# Patient Record
Sex: MALE | NOT HISPANIC OR LATINO | Employment: FULL TIME | URBAN - METROPOLITAN AREA
[De-identification: names, ages, dates, MRNs, and addresses within clinical notes are randomized per-mention and may not be internally consistent; named-entity substitution may affect disease eponyms.]

---

## 2022-09-29 ENCOUNTER — HOSPITAL ENCOUNTER (EMERGENCY)
Facility: HOSPITAL | Age: 22
Discharge: PSYCHIATRIC OR SUBSTANCE USE FACILITY | End: 2022-09-30
Attending: EMERGENCY MEDICINE
Payer: OTHER GOVERNMENT

## 2022-09-29 DIAGNOSIS — R45.851 SUICIDAL IDEATION: Primary | ICD-10-CM

## 2022-09-29 DIAGNOSIS — Z00.8 MEDICAL CLEARANCE FOR PSYCHIATRIC ADMISSION: ICD-10-CM

## 2022-09-29 LAB
ALBUMIN SERPL-MCNC: 4.5 GM/DL (ref 3.5–5)
ALBUMIN/GLOB SERPL: 1.6 RATIO (ref 1.1–2)
ALP SERPL-CCNC: 106 UNIT/L (ref 40–150)
ALT SERPL-CCNC: 28 UNIT/L (ref 0–55)
AMPHET UR QL SCN: NEGATIVE
APPEARANCE UR: ABNORMAL
AST SERPL-CCNC: 18 UNIT/L (ref 5–34)
BACTERIA #/AREA URNS AUTO: NORMAL /HPF
BARBITURATE SCN PRESENT UR: NEGATIVE
BASOPHILS # BLD AUTO: 0.06 X10(3)/MCL (ref 0–0.2)
BASOPHILS NFR BLD AUTO: 1.1 %
BENZODIAZ UR QL SCN: NEGATIVE
BILIRUB UR QL STRIP.AUTO: NEGATIVE MG/DL
BILIRUBIN DIRECT+TOT PNL SERPL-MCNC: 0.8 MG/DL
BUN SERPL-MCNC: 14.5 MG/DL (ref 8.9–20.6)
CALCIUM SERPL-MCNC: 9.8 MG/DL (ref 8.4–10.2)
CANNABINOIDS UR QL SCN: NEGATIVE
CHLORIDE SERPL-SCNC: 108 MMOL/L (ref 98–107)
CO2 SERPL-SCNC: 25 MMOL/L (ref 22–29)
COCAINE UR QL SCN: NEGATIVE
COLOR UR AUTO: YELLOW
CREAT SERPL-MCNC: 0.8 MG/DL (ref 0.73–1.18)
EOSINOPHIL # BLD AUTO: 0.14 X10(3)/MCL (ref 0–0.9)
EOSINOPHIL NFR BLD AUTO: 2.6 %
ERYTHROCYTE [DISTWIDTH] IN BLOOD BY AUTOMATED COUNT: 12.5 % (ref 11.5–17)
ETHANOL SERPL-MCNC: <10 MG/DL
FENTANYL UR QL SCN: NEGATIVE
GFR SERPLBLD CREATININE-BSD FMLA CKD-EPI: >60 MLS/MIN/1.73/M2
GLOBULIN SER-MCNC: 2.8 GM/DL (ref 2.4–3.5)
GLUCOSE SERPL-MCNC: 93 MG/DL (ref 74–100)
GLUCOSE UR QL STRIP.AUTO: NEGATIVE MG/DL
HCT VFR BLD AUTO: 44.5 % (ref 42–52)
HGB BLD-MCNC: 15.3 GM/DL (ref 14–18)
IMM GRANULOCYTES # BLD AUTO: 0.01 X10(3)/MCL (ref 0–0.04)
IMM GRANULOCYTES NFR BLD AUTO: 0.2 %
KETONES UR QL STRIP.AUTO: NEGATIVE MG/DL
LEUKOCYTE ESTERASE UR QL STRIP.AUTO: NEGATIVE UNIT/L
LYMPHOCYTES # BLD AUTO: 1.58 X10(3)/MCL (ref 0.6–4.6)
LYMPHOCYTES NFR BLD AUTO: 29.6 %
MCH RBC QN AUTO: 30.6 PG (ref 27–31)
MCHC RBC AUTO-ENTMCNC: 34.4 MG/DL (ref 33–36)
MCV RBC AUTO: 89 FL (ref 80–94)
MDMA UR QL SCN: NEGATIVE
MONOCYTES # BLD AUTO: 0.52 X10(3)/MCL (ref 0.1–1.3)
MONOCYTES NFR BLD AUTO: 9.8 %
NEUTROPHILS # BLD AUTO: 3 X10(3)/MCL (ref 2.1–9.2)
NEUTROPHILS NFR BLD AUTO: 56.7 %
NITRITE UR QL STRIP.AUTO: NEGATIVE
NRBC BLD AUTO-RTO: 0 %
OPIATES UR QL SCN: NEGATIVE
PCP UR QL: NEGATIVE
PH UR STRIP.AUTO: 7.5 [PH]
PH UR: 7.5 [PH] (ref 3–11)
PLATELET # BLD AUTO: 310 X10(3)/MCL (ref 130–400)
PMV BLD AUTO: 9.4 FL (ref 7.4–10.4)
POTASSIUM SERPL-SCNC: 4.3 MMOL/L (ref 3.5–5.1)
PROT SERPL-MCNC: 7.3 GM/DL (ref 6.4–8.3)
PROT UR QL STRIP.AUTO: NEGATIVE MG/DL
RBC # BLD AUTO: 5 X10(6)/MCL (ref 4.7–6.1)
RBC #/AREA URNS AUTO: <5 /HPF
RBC UR QL AUTO: NEGATIVE UNIT/L
SARS-COV-2 RDRP RESP QL NAA+PROBE: NEGATIVE
SODIUM SERPL-SCNC: 138 MMOL/L (ref 136–145)
SP GR UR STRIP.AUTO: 1.02 (ref 1–1.03)
SPECIFIC GRAVITY, URINE AUTO (.000) (OHS): 1.02 (ref 1–1.03)
SQUAMOUS #/AREA URNS AUTO: <5 /HPF
TSH SERPL-ACNC: 1.05 UIU/ML (ref 0.35–4.94)
UROBILINOGEN UR STRIP-ACNC: 0.2 MG/DL
WBC # SPEC AUTO: 5.3 X10(3)/MCL (ref 4.5–11.5)
WBC #/AREA URNS AUTO: <5 /HPF

## 2022-09-29 PROCEDURE — 82077 ASSAY SPEC XCP UR&BREATH IA: CPT | Performed by: EMERGENCY MEDICINE

## 2022-09-29 PROCEDURE — 99285 EMERGENCY DEPT VISIT HI MDM: CPT | Mod: 25

## 2022-09-29 PROCEDURE — 80053 COMPREHEN METABOLIC PANEL: CPT | Performed by: EMERGENCY MEDICINE

## 2022-09-29 PROCEDURE — 36415 COLL VENOUS BLD VENIPUNCTURE: CPT | Performed by: EMERGENCY MEDICINE

## 2022-09-29 PROCEDURE — 80307 DRUG TEST PRSMV CHEM ANLYZR: CPT | Performed by: EMERGENCY MEDICINE

## 2022-09-29 PROCEDURE — 85025 COMPLETE CBC W/AUTO DIFF WBC: CPT | Performed by: EMERGENCY MEDICINE

## 2022-09-29 PROCEDURE — 87635 SARS-COV-2 COVID-19 AMP PRB: CPT | Performed by: EMERGENCY MEDICINE

## 2022-09-29 PROCEDURE — 81001 URINALYSIS AUTO W/SCOPE: CPT | Performed by: EMERGENCY MEDICINE

## 2022-09-29 PROCEDURE — 84443 ASSAY THYROID STIM HORMONE: CPT | Performed by: EMERGENCY MEDICINE
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
## 2022-09-30 VITALS
OXYGEN SATURATION: 98 % | HEART RATE: 91 BPM | WEIGHT: 175 LBS | RESPIRATION RATE: 16 BRPM | SYSTOLIC BLOOD PRESSURE: 131 MMHG | DIASTOLIC BLOOD PRESSURE: 71 MMHG | BODY MASS INDEX: 25.05 KG/M2 | TEMPERATURE: 98 F | HEIGHT: 70 IN

## 2023-08-20 ENCOUNTER — HOSPITAL ENCOUNTER (EMERGENCY)
Facility: HOSPITAL | Age: 23
Discharge: HOME | End: 2023-08-20
Attending: EMERGENCY MEDICINE
Payer: OTHER GOVERNMENT

## 2023-08-20 VITALS
SYSTOLIC BLOOD PRESSURE: 133 MMHG | RESPIRATION RATE: 18 BRPM | HEART RATE: 72 BPM | TEMPERATURE: 99 F | DIASTOLIC BLOOD PRESSURE: 77 MMHG | OXYGEN SATURATION: 98 %

## 2023-08-20 DIAGNOSIS — M25.562 LEFT KNEE PAIN: ICD-10-CM

## 2023-08-20 DIAGNOSIS — T14.8XXA ABRASION: ICD-10-CM

## 2023-08-20 DIAGNOSIS — M23.92 ACUTE INTERNAL DERANGEMENT OF KNEE, LEFT: Primary | ICD-10-CM

## 2023-08-20 PROCEDURE — 29505 APPLICATION LONG LEG SPLINT: CPT | Mod: LT

## 2023-08-20 PROCEDURE — 25000003 PHARM REV CODE 250: Performed by: NURSE PRACTITIONER

## 2023-08-20 PROCEDURE — 99283 EMERGENCY DEPT VISIT LOW MDM: CPT | Mod: 25

## 2023-08-20 RX ADMIN — BACITRACIN ZINC, NEOMYCIN, POLYMYXIN B: 400; 3.5; 5 OINTMENT TOPICAL at 12:08

## 2023-08-20 NOTE — Clinical Note
"Jose Stinson" Joe was seen and treated in our emergency department on 8/20/2023.  He may return with limitations on 08/24/2023.  Light duty, limited walking. Please allow to elevate as much as possible. Will need to wear knee immobilizer at all times.     Sincerely,      Kanchan Oropeza FNP    "

## 2023-08-20 NOTE — DISCHARGE INSTRUCTIONS
Wear knee immobilizer for 2 weeks. If pain still on the side of your knee and not improving you need to see your primary care provider for further evaluation and possible MRI of knee if warranted. Take ibuprofen 800mg every 8 hours for pain/inflammation. Rest and elevate. May ice. Walk with immobilizer at all times. May take off to shower.

## 2023-08-20 NOTE — ED PROVIDER NOTES
Encounter Date: 8/20/2023       History     Chief Complaint   Patient presents with    Knee Pain     POV with L knee pain and abrasion. States was playing baseball yesterday and leg twisted outward, cannot bend it now or bare weight.     See MDM    The history is provided by the patient. No  was used.     Review of patient's allergies indicates:  No Known Allergies  No past medical history on file.  No past surgical history on file.  No family history on file.     Review of Systems   Musculoskeletal:  Positive for joint swelling (left knee pain).   Skin:  Positive for wound (abrasion to left knee and left lower leg).   All other systems reviewed and are negative.      Physical Exam     Initial Vitals [08/20/23 0944]   BP Pulse Resp Temp SpO2   133/68 79 18 97.9 °F (36.6 °C) 98 %      MAP       --         Physical Exam    Nursing note and vitals reviewed.  Constitutional: He appears well-developed and well-nourished.   Eyes: Conjunctivae are normal.   Cardiovascular:  Normal rate, regular rhythm, normal heart sounds and intact distal pulses.           Pulmonary/Chest: Breath sounds normal. No respiratory distress.   Musculoskeletal:      Left knee: No swelling, effusion, erythema or bony tenderness. Decreased range of motion. Tenderness present over the lateral joint line.      Comments: Very limited flexion of left knee. Can extend completely. Can straight leg raise without pain. Can stand and bear weight but feels pain to left lateral knee/thigh when doing so.     All other adjacent joints otherwise normal       Neurological: He is alert and oriented to person, place, and time.   Skin: Skin is warm and dry.   Large 9cm abrasion to left lower leg on lateral aspect, small 2 cm abrasion to knee   Psychiatric: He has a normal mood and affect.         ED Course   Procedures  Labs Reviewed - No data to display       Imaging Results              X-Ray Knee Complete 4 or More Views Left (Final result)   Result time 08/20/23 10:31:19      Final result by Shun Darden MD (08/20/23 10:31:19)                   Narrative:    EXAMINATION  XR KNEE COMP 4 OR MORE VIEWS LEFT    CLINICAL HISTORY  Pain in left knee    TECHNIQUE  A total of 4 image(s) submitted of the left knee.    COMPARISON  None available at the time of initial interpretation.    FINDINGS  No displaced fracture or dislocation is identified. The visualized joint spaces are preserved and there are no findings indicative of a joint effusion. No aggressive osseous lesion or periosteal reaction is identified.    The included soft tissues are without acute abnormality.    IMPRESSION  No convincing radiographic abnormality.      Electronically signed by: Shun Darden  Date:    08/20/2023  Time:    10:31                                     Medications   neomycin-bacitracnZn-polymyxnB packet (has no administration in time range)     Medical Decision Making  24 y/o male presents with playing baseball game yesterday when his left leg twisted and he felt and heard a pop. Unable to bear weight on it yesterday s/t pain. Also has large abrasion to left lateral lower leg from playing yesterday. He did ice it. He took ibuprofen 600mg today around 0730. Bending his left knee hurts.       Xray neg for acute findings. Pain more lateral knee and thigh. Will immobilize. We did dress abrasions with nonstick and I instructed him on cleaning abrasions, applying triple antibiotic ointment and dressing with nonstick before applying knee immobilizer. May walk with immobilizer on. Close f/u with primary care if symptoms are not improving in 1-2 weeks. Ibuprofen.     Amount and/or Complexity of Data Reviewed  Radiology: ordered.     Details: no acute findings    Risk  OTC drugs.      Additional MDM:   Differential Diagnosis:   Other: The following diagnoses were also considered and will be evaluated: internal derangement of knee, knee contusion and knee effusion.                             Clinical Impression:   Final diagnoses:  [M25.562] Left knee pain  [M23.92] Acute internal derangement of knee, left (Primary)  [T14.8XXA] Abrasion        ED Disposition Condition    Discharge Stable          ED Prescriptions    None       Follow-up Information       Follow up With Specialties Details Why Contact Info    [facility]  Call in 1 week As needed, If symptoms worsen [address]  [place] [id number]  [phone]               Kanchan Oropeza, MANDI  08/20/23 8940